# Patient Record
Sex: FEMALE | Race: WHITE | Employment: FULL TIME | URBAN - METROPOLITAN AREA
[De-identification: names, ages, dates, MRNs, and addresses within clinical notes are randomized per-mention and may not be internally consistent; named-entity substitution may affect disease eponyms.]

---

## 2023-07-13 ENCOUNTER — OFFICE VISIT (OUTPATIENT)
Dept: PRIMARY CARE | Facility: CLINIC | Age: 43
End: 2023-07-13
Payer: COMMERCIAL

## 2023-07-13 VITALS — WEIGHT: 157 LBS | HEIGHT: 66 IN | BODY MASS INDEX: 25.23 KG/M2

## 2023-07-13 DIAGNOSIS — Z00.00 HEALTH CARE MAINTENANCE: Primary | ICD-10-CM

## 2023-07-13 DIAGNOSIS — Z01.419 ENCOUNTER FOR GYNECOLOGICAL EXAMINATION: ICD-10-CM

## 2023-07-13 PROCEDURE — 99396 PREV VISIT EST AGE 40-64: CPT | Performed by: INTERNAL MEDICINE

## 2023-07-14 ENCOUNTER — LAB (OUTPATIENT)
Dept: LAB | Facility: LAB | Age: 43
End: 2023-07-14
Payer: COMMERCIAL

## 2023-07-14 DIAGNOSIS — Z00.00 HEALTH CARE MAINTENANCE: ICD-10-CM

## 2023-07-14 LAB
ALANINE AMINOTRANSFERASE (SGPT) (U/L) IN SER/PLAS: 15 U/L (ref 7–45)
ALBUMIN (G/DL) IN SER/PLAS: 4.2 G/DL (ref 3.4–5)
ALKALINE PHOSPHATASE (U/L) IN SER/PLAS: 95 U/L (ref 33–110)
ANION GAP IN SER/PLAS: 12 MMOL/L (ref 10–20)
ASPARTATE AMINOTRANSFERASE (SGOT) (U/L) IN SER/PLAS: 16 U/L (ref 9–39)
BILIRUBIN TOTAL (MG/DL) IN SER/PLAS: 0.3 MG/DL (ref 0–1.2)
CALCIUM (MG/DL) IN SER/PLAS: 9.6 MG/DL (ref 8.6–10.6)
CARBON DIOXIDE, TOTAL (MMOL/L) IN SER/PLAS: 26 MMOL/L (ref 21–32)
CHLORIDE (MMOL/L) IN SER/PLAS: 106 MMOL/L (ref 98–107)
CHOLESTEROL (MG/DL) IN SER/PLAS: 160 MG/DL (ref 0–199)
CHOLESTEROL IN HDL (MG/DL) IN SER/PLAS: 38.3 MG/DL
CHOLESTEROL/HDL RATIO: 4.2
COBALAMIN (VITAMIN B12) (PG/ML) IN SER/PLAS: 394 PG/ML (ref 211–911)
CREATININE (MG/DL) IN SER/PLAS: 0.73 MG/DL (ref 0.5–1.05)
ERYTHROCYTE DISTRIBUTION WIDTH (RATIO) BY AUTOMATED COUNT: 13.3 % (ref 11.5–14.5)
ERYTHROCYTE MEAN CORPUSCULAR HEMOGLOBIN CONCENTRATION (G/DL) BY AUTOMATED: 34.3 G/DL (ref 32–36)
ERYTHROCYTE MEAN CORPUSCULAR VOLUME (FL) BY AUTOMATED COUNT: 92 FL (ref 80–100)
ERYTHROCYTES (10*6/UL) IN BLOOD BY AUTOMATED COUNT: 4.58 X10E12/L (ref 4–5.2)
GFR FEMALE: >90 ML/MIN/1.73M2
GLUCOSE (MG/DL) IN SER/PLAS: 80 MG/DL (ref 74–99)
HEMATOCRIT (%) IN BLOOD BY AUTOMATED COUNT: 42.3 % (ref 36–46)
HEMOGLOBIN (G/DL) IN BLOOD: 14.5 G/DL (ref 12–16)
IRON (UG/DL) IN SER/PLAS: 116 UG/DL (ref 35–150)
IRON BINDING CAPACITY (UG/DL) IN SER/PLAS: 388 UG/DL (ref 240–445)
IRON SATURATION (%) IN SER/PLAS: 30 % (ref 25–45)
LDL: 94 MG/DL (ref 0–99)
LEUKOCYTES (10*3/UL) IN BLOOD BY AUTOMATED COUNT: 9.9 X10E9/L (ref 4.4–11.3)
NRBC (PER 100 WBCS) BY AUTOMATED COUNT: 0 /100 WBC (ref 0–0)
PLATELETS (10*3/UL) IN BLOOD AUTOMATED COUNT: 197 X10E9/L (ref 150–450)
POTASSIUM (MMOL/L) IN SER/PLAS: 4.1 MMOL/L (ref 3.5–5.3)
PROTEIN TOTAL: 7.1 G/DL (ref 6.4–8.2)
SODIUM (MMOL/L) IN SER/PLAS: 140 MMOL/L (ref 136–145)
TRIGLYCERIDE (MG/DL) IN SER/PLAS: 139 MG/DL (ref 0–149)
UREA NITROGEN (MG/DL) IN SER/PLAS: 12 MG/DL (ref 6–23)
VLDL: 28 MG/DL (ref 0–40)

## 2023-07-14 PROCEDURE — 36415 COLL VENOUS BLD VENIPUNCTURE: CPT

## 2023-07-14 PROCEDURE — 83540 ASSAY OF IRON: CPT

## 2023-07-14 PROCEDURE — 80061 LIPID PANEL: CPT

## 2023-07-14 PROCEDURE — 83550 IRON BINDING TEST: CPT

## 2023-07-14 PROCEDURE — 85027 COMPLETE CBC AUTOMATED: CPT

## 2023-07-14 PROCEDURE — 80053 COMPREHEN METABOLIC PANEL: CPT

## 2023-07-14 PROCEDURE — 82607 VITAMIN B-12: CPT

## 2023-07-15 NOTE — PROGRESS NOTES
"Subjective   Patient ID: Larissa Smiley is a 43 y.o. female who presents for CPE.    HPI   Patient is here for physical   Wants referral to OB/GYN she goes to Dr. Malia Kimbrough   She has taken leave of absence because she is stressed out at work yesterday she felt sharp pain in the chest and she could not control her breathing felt like her ankle.  She works in retail    complaints of sinus congestion chronically since last week no symptoms of slurring of having local sinus congestion cough  Under care of Dr. galindo for GYN  Also complaining of right shoulder pain denies any injury does a lot of repetitive work from the right arm     Past medical history: Migraine, frequent sinus infections, cholecystectomy, IUD, lumpectomy right breast  Social history: 1 to 2 cups of caffeine, alcohol rarely, 7 to 10 cigarettes/day  Family history: Mother colectomy Father  of alcoholism, sister on dialysis had kidney transplant   Review of Systems    Objective   Ht 1.676 m (5' 6\")   Wt 71.2 kg (157 lb)   BMI 25.34 kg/m²     Physical Exam  Vitals reviewed.   Constitutional:       Appearance: Normal appearance.   HENT:      Head: Normocephalic and atraumatic.      Right Ear: Tympanic membrane, ear canal and external ear normal.      Left Ear: Tympanic membrane, ear canal and external ear normal.      Nose: Nose normal.      Comments: Deviated septum to the right     Mouth/Throat:      Pharynx: Oropharynx is clear.   Eyes:      Extraocular Movements: Extraocular movements intact.      Conjunctiva/sclera: Conjunctivae normal.      Pupils: Pupils are equal, round, and reactive to light.   Cardiovascular:      Rate and Rhythm: Normal rate and regular rhythm.      Pulses: Normal pulses.      Heart sounds: Normal heart sounds.   Pulmonary:      Effort: Pulmonary effort is normal.      Breath sounds: Normal breath sounds.   Abdominal:      General: Abdomen is flat. Bowel sounds are normal.      Palpations: Abdomen is soft. "   Musculoskeletal:      Cervical back: Normal range of motion and neck supple.   Skin:     General: Skin is warm and dry.   Neurological:      General: No focal deficit present.      Mental Status: She is alert and oriented to person, place, and time.   Psychiatric:         Mood and Affect: Mood normal.         Assessment/Plan   Problem List Items Addressed This Visit    None  Visit Diagnoses       Health care maintenance    -  Primary    Relevant Orders    CBC (Completed)    Comprehensive Metabolic Panel (Completed)    Lipid Panel (Completed)    Iron and TIBC (Completed)    Vitamin B12 (Completed)    Encounter for gynecological examination        Relevant Orders    Referral to Obstetrics / Gynecology          past recap  Physical normal except for deviated nasal septum  Deviated nasal septum could be the cause for #recurrent sinus infection  Patient had nasal injury as a child  Refer to ENT  Refer to GYN  Routine blood work ordered  Encouraged to quit smoking  Follow-up if blood work is abnormal   advised to see counselor for her stress and anxiety but patient does not think she needs it at this time

## 2024-03-07 ENCOUNTER — PROCEDURE VISIT (OUTPATIENT)
Dept: PRIMARY CARE | Facility: CLINIC | Age: 44
End: 2024-03-07
Payer: COMMERCIAL

## 2024-03-07 VITALS
SYSTOLIC BLOOD PRESSURE: 120 MMHG | WEIGHT: 157 LBS | DIASTOLIC BLOOD PRESSURE: 82 MMHG | HEIGHT: 66 IN | BODY MASS INDEX: 25.23 KG/M2

## 2024-03-07 DIAGNOSIS — Z12.31 SCREENING MAMMOGRAM, ENCOUNTER FOR: ICD-10-CM

## 2024-03-07 DIAGNOSIS — R87.629 ABNORMAL PAP SMEAR OF VAGINA: Primary | ICD-10-CM

## 2024-03-07 DIAGNOSIS — N95.1 MENOPAUSAL SYMPTOMS: ICD-10-CM

## 2024-03-07 PROCEDURE — 99213 OFFICE O/P EST LOW 20 MIN: CPT | Performed by: INTERNAL MEDICINE

## 2024-03-07 NOTE — PROGRESS NOTES
"Subjective   Patient ID: Larissa Smiley is a 43 y.o. female who presents for Follow-up.    HPI   Patient is here for follow-up  She is having irregular menstrual cycle  Having hot flashes mood swings  She has IUD for 8 years.       patient is here for physical   Wants referral to OB/GYN she goes to Dr. Malia Kimbrough   She has taken leave of absence because she is stressed out at work yesterday she felt sharp pain in the chest and she could not control her breathing felt like her ankle.  She works in retail    complaints of sinus congestion chronically since last week no symptoms of slurring of having local sinus congestion cough  Under care of Dr. galindo for GYN  Also complaining of right shoulder pain denies any injury does a lot of repetitive work from the right arm     Past medical history: Migraine, frequent sinus infections, cholecystectomy, IUD, lumpectomy right breast  Social history: 1 to 2 cups of caffeine, alcohol rarely, 7 to 10 cigarettes/day  Family history: Mother colectomy Father  of alcoholism, sister on dialysis had kidney transplant   Review of Systems    Objective   /82   Ht 1.676 m (5' 6\")   Wt 71.2 kg (157 lb)   BMI 25.34 kg/m²     Physical Exam  Vitals reviewed.   Constitutional:       Appearance: Normal appearance.   HENT:      Head: Normocephalic and atraumatic.      Right Ear: Tympanic membrane, ear canal and external ear normal.      Left Ear: Tympanic membrane, ear canal and external ear normal.      Nose: Nose normal.      Comments: Deviated septum to the right     Mouth/Throat:      Pharynx: Oropharynx is clear.   Eyes:      Extraocular Movements: Extraocular movements intact.      Conjunctiva/sclera: Conjunctivae normal.      Pupils: Pupils are equal, round, and reactive to light.   Cardiovascular:      Rate and Rhythm: Normal rate and regular rhythm.      Pulses: Normal pulses.      Heart sounds: Normal heart sounds.   Pulmonary:      Effort: Pulmonary effort is normal.      " Breath sounds: Normal breath sounds.   Abdominal:      General: Abdomen is flat. Bowel sounds are normal.      Palpations: Abdomen is soft.   Musculoskeletal:      Cervical back: Normal range of motion and neck supple.   Skin:     General: Skin is warm and dry.   Neurological:      General: No focal deficit present.      Mental Status: She is alert and oriented to person, place, and time.   Psychiatric:         Mood and Affect: Mood normal.         Assessment/Plan   Problem List Items Addressed This Visit    None  Visit Diagnoses       Abnormal Pap smear of vagina    -  Primary    Menopausal symptoms        Relevant Orders    Referral to Obstetrics / Gynecology    Screening mammogram, encounter for        Relevant Orders    BI mammo bilateral screening tomosynthesis (Completed)          past recap  Physical normal except for deviated nasal septum  Deviated nasal septum could be the cause for #recurrent sinus infection  Patient had nasal injury as a child  Refer to ENT  Refer to GYN  Routine blood work ordered  Encouraged to quit smoking  Follow-up if blood work is abnormal   advised to see counselor for her stress and anxiety but patient does not think she needs it at this time    3/7/2024  Patient has history of abnormal Pap 2022  Patient needs to have IUD removed  Refer to OB/GYN for menopausal symptoms  Mammogram ordered

## 2024-03-13 ENCOUNTER — HOSPITAL ENCOUNTER (OUTPATIENT)
Dept: RADIOLOGY | Facility: CLINIC | Age: 44
Discharge: HOME | End: 2024-03-13
Payer: COMMERCIAL

## 2024-03-13 VITALS — BODY MASS INDEX: 26.52 KG/M2 | WEIGHT: 165 LBS | HEIGHT: 66 IN

## 2024-03-13 DIAGNOSIS — Z12.31 SCREENING MAMMOGRAM, ENCOUNTER FOR: ICD-10-CM

## 2024-03-13 PROCEDURE — 77063 BREAST TOMOSYNTHESIS BI: CPT | Performed by: RADIOLOGY

## 2024-03-13 PROCEDURE — 77067 SCR MAMMO BI INCL CAD: CPT | Performed by: RADIOLOGY

## 2024-03-13 PROCEDURE — 77067 SCR MAMMO BI INCL CAD: CPT

## 2024-06-05 NOTE — PROGRESS NOTES
"Annual  Subjective   HPI:  Larissa Smiley is a 44 y.o. female  No LMP recorded (lmp unknown). for annual exam.    Complaints:   none  Periods: none with Mirena  Dysmenorrhea:  none    GYNH:   Current Mirena     History of abnormal Pap smear:   YES  History of abnormal mammogram:     OB History          2    Para   1    Term   1       0    AB   1    Living   1         SAB   1    IAB   0    Ectopic   0    Multiple   0    Live Births   1                  Past Medical History:   Diagnosis Date    Personal history of (healed) traumatic fracture 10/22/2020    History of fracture of nasal bone       Past Surgical History:   Procedure Laterality Date    OTHER SURGICAL HISTORY  10/22/2020    Cholecystectomy    OTHER SURGICAL HISTORY  10/22/2020    Lumpectomy       Prior to Admission medications    Medication Sig Start Date End Date Taking? Authorizing Provider   Mirena 21 mcg/24 hr (8 yrs) 52 mg IUD 52 mg by intrauterine route 1 time. 24  Yes Historical Provider, MD       Social History     Tobacco Use    Smoking status: Some Days     Current packs/day: 0.50     Average packs/day: 0.5 packs/day for 30.9 years (15.5 ttl pk-yrs)     Types: Cigarettes     Start date: 1993    Smokeless tobacco: Never   Vaping Use    Vaping status: Never Used   Substance Use Topics    Drug use: Never        Objective   /64   Ht 1.676 m (5' 6\")   Wt 76.2 kg (168 lb)   LMP  (LMP Unknown) Comment: iud  BMI 27.12 kg/m²      General:   Alert and oriented, in no acute distress   Neck: Supple. No visible thyromegaly.    Breast/Axilla: Normal to palpation bilaterally without masses, skin changes, or nipple discharge.    Abdomen: Soft, non-tender, without masses or organomegaly   Vulva: Normal architecture without erythema, masses, or lesions.    Vagina: Normal mucosa without lesions, masses, or atrophy. No abnormal vaginal discharge.    Cervix: Normal without masses, lesions, or signs of cervicitis   Uterus: Normal, " mobile, non-enlarged uterus   Adnexa: Normal without masses or lesions   Pelvic Floor normal   Psych Normal affect. Normal mood.      Assessment/Plan     Routine annual  OB/GYN Preventive:     - Pap smear indicated every 3-5 years if normal and otherwise low risk   - Self breast exam monthly and clinical breast examination/mammogram yearly   - Screening colonoscopy recommended starting age 45, then Q3-10 years depending on testing and family history   - Genitourinary skin hygiene discussed.  - Diet/Weight management discussed.  - Exercise 30-60 minutes 3-5 times/day    Malia Kimbrough MD      IUD Removal    Date/Time: 6/6/2024 10:16 AM    Performed by: Malia Kimbrough MD  Authorized by: Malia Kimbrough MD    Consent:     Consent obtained:  Verbal    Consent given by:  Patient    Procedure risks and benefits discussed: yes      Patient questions answered: yes      Patient agrees, verbalizes understanding, and wants to proceed: yes    Procedure:     Removed with no complications: yes

## 2024-06-06 ENCOUNTER — OFFICE VISIT (OUTPATIENT)
Dept: OBSTETRICS AND GYNECOLOGY | Facility: CLINIC | Age: 44
End: 2024-06-06
Payer: COMMERCIAL

## 2024-06-06 VITALS
HEIGHT: 66 IN | BODY MASS INDEX: 27 KG/M2 | SYSTOLIC BLOOD PRESSURE: 112 MMHG | WEIGHT: 168 LBS | DIASTOLIC BLOOD PRESSURE: 64 MMHG

## 2024-06-06 DIAGNOSIS — Z01.419 ENCOUNTER FOR GYNECOLOGICAL EXAMINATION WITHOUT ABNORMAL FINDING: Primary | ICD-10-CM

## 2024-06-06 DIAGNOSIS — Z30.433 ENCOUNTER FOR REMOVAL AND REINSERTION OF INTRAUTERINE CONTRACEPTIVE DEVICE (IUD): ICD-10-CM

## 2024-06-06 PROCEDURE — 58300 INSERT INTRAUTERINE DEVICE: CPT | Performed by: OBSTETRICS & GYNECOLOGY

## 2024-06-06 PROCEDURE — 99396 PREV VISIT EST AGE 40-64: CPT | Performed by: OBSTETRICS & GYNECOLOGY

## 2024-06-06 PROCEDURE — 58301 REMOVE INTRAUTERINE DEVICE: CPT | Performed by: OBSTETRICS & GYNECOLOGY

## 2024-06-06 PROCEDURE — 2500000004 HC RX 250 GENERAL PHARMACY W/ HCPCS (ALT 636 FOR OP/ED): Performed by: OBSTETRICS & GYNECOLOGY

## 2024-06-06 PROCEDURE — 99396 PREV VISIT EST AGE 40-64: CPT | Mod: 25 | Performed by: OBSTETRICS & GYNECOLOGY

## 2024-06-06 RX ORDER — LEVONORGESTREL 52 MG/1
1 INTRAUTERINE DEVICE INTRAUTERINE ONCE
COMMUNITY
Start: 2024-04-12

## 2024-06-06 RX ADMIN — LEVONORGESTREL 52 MG: 52 INTRAUTERINE DEVICE INTRAUTERINE at 12:49

## 2024-06-06 ASSESSMENT — PAIN SCALES - GENERAL: PAINLEVEL: 0-NO PAIN

## 2024-06-06 ASSESSMENT — ENCOUNTER SYMPTOMS
DEPRESSION: 0
LOSS OF SENSATION IN FEET: 0
OCCASIONAL FEELINGS OF UNSTEADINESS: 0

## 2024-06-06 ASSESSMENT — PATIENT HEALTH QUESTIONNAIRE - PHQ9
1. LITTLE INTEREST OR PLEASURE IN DOING THINGS: NOT AT ALL
SUM OF ALL RESPONSES TO PHQ9 QUESTIONS 1 & 2: 0
2. FEELING DOWN, DEPRESSED OR HOPELESS: NOT AT ALL

## 2024-06-06 ASSESSMENT — LIFESTYLE VARIABLES
AUDIT-C TOTAL SCORE: 1
SKIP TO QUESTIONS 9-10: 1
HOW OFTEN DO YOU HAVE A DRINK CONTAINING ALCOHOL: MONTHLY OR LESS
HOW MANY STANDARD DRINKS CONTAINING ALCOHOL DO YOU HAVE ON A TYPICAL DAY: 1 OR 2
HOW OFTEN DO YOU HAVE SIX OR MORE DRINKS ON ONE OCCASION: NEVER

## 2024-06-06 NOTE — PROGRESS NOTES
IUD Insertion    Date/Time: 6/6/2024 10:18 AM    Performed by: Malia Kimbrough MD  Authorized by: Malia Kimbrough MD    Consent:     Procedure risks and benefits discussed: yes      Patient questions answered: yes      Patient agrees, verbalizes understanding, and wants to proceed: yes    Procedure:     Pelvic exam performed: yes      Cervix cleaned and prepped: yes      Speculum placed in vagina: yes      Tenaculum applied to cervix: yes      Uterus sounded: yes      Uterus sound depth (cm):  7    IUD inserted with no complications: yes      IUD type:  Mirena    Strings trimmed: yes    Post-procedure:     Patient tolerated procedure well: yes

## 2024-06-17 LAB
CYTOLOGY CMNT CVX/VAG CYTO-IMP: NORMAL
HPV HR 12 DNA GENITAL QL NAA+PROBE: POSITIVE
HPV HR GENOTYPES PNL CVX NAA+PROBE: POSITIVE
HPV16 DNA SPEC QL NAA+PROBE: NEGATIVE
HPV18 DNA SPEC QL NAA+PROBE: NEGATIVE
LAB AP HPV GENOTYPE QUESTION: YES
LAB AP HPV HR: NORMAL
LABORATORY COMMENT REPORT: NORMAL
PATH REPORT.TOTAL CANCER: NORMAL

## 2024-07-15 ENCOUNTER — APPOINTMENT (OUTPATIENT)
Dept: OBSTETRICS AND GYNECOLOGY | Facility: CLINIC | Age: 44
End: 2024-07-15
Payer: COMMERCIAL

## 2024-11-18 ENCOUNTER — OFFICE VISIT (OUTPATIENT)
Dept: PRIMARY CARE | Facility: CLINIC | Age: 44
End: 2024-11-18
Payer: COMMERCIAL

## 2024-11-18 VITALS
WEIGHT: 173 LBS | SYSTOLIC BLOOD PRESSURE: 116 MMHG | BODY MASS INDEX: 27.8 KG/M2 | HEIGHT: 66 IN | DIASTOLIC BLOOD PRESSURE: 62 MMHG

## 2024-11-18 DIAGNOSIS — H69.91 EUSTACHIAN TUBE DYSFUNCTION, RIGHT: Primary | ICD-10-CM

## 2024-11-18 DIAGNOSIS — R09.81 SINUS CONGESTION: ICD-10-CM

## 2024-11-18 PROCEDURE — 99213 OFFICE O/P EST LOW 20 MIN: CPT | Performed by: INTERNAL MEDICINE

## 2024-11-18 PROCEDURE — 3008F BODY MASS INDEX DOCD: CPT | Performed by: INTERNAL MEDICINE

## 2024-11-18 RX ORDER — AZITHROMYCIN 250 MG/1
TABLET, FILM COATED ORAL
Qty: 6 TABLET | Refills: 0 | Status: SHIPPED | OUTPATIENT
Start: 2024-11-18 | End: 2024-11-23

## 2024-11-18 NOTE — PROGRESS NOTES
"Subjective   Patient ID: Larissa Smiley is a 44 y.o. female who presents for sinus congestion.    HPI   Patient is here with complaints of having sinus congestion pressure in the right ear sore throat  Symptoms going on for 2 weeks  Tried all the over-the-counter medications and inhaler nothing is helping     past recap     patient is here for follow-up  She is having irregular menstrual cycle  Having hot flashes mood swings  She has IUD for 8 years.     patient is here for physical   Wants referral to OB/GYN she goes to Dr. Malia Kimbrough   She has taken leave of absence because she is stressed out at work yesterday she felt sharp pain in the chest and she could not control her breathing felt like her ankle.  She works in retail    complaints of sinus congestion chronically since last week no symptoms of slurring of having local sinus congestion cough  Under care of Dr. galindo for GYN  Also complaining of right shoulder pain denies any injury does a lot of repetitive work from the right arm     Past medical history: Migraine, frequent sinus infections, cholecystectomy, IUD, lumpectomy right breast  Social history: 1 to 2 cups of caffeine, alcohol rarely, 7 to 10 cigarettes/day  Family history: Mother colectomy Father  of alcoholism, sister on dialysis had kidney transplant   Review of Systems    Objective   /62   Ht 1.676 m (5' 6\")   Wt 78.5 kg (173 lb)   BMI 27.92 kg/m²     Physical Exam  Vitals reviewed.   Constitutional:       Appearance: Normal appearance.   HENT:      Head: Normocephalic and atraumatic.      Right Ear: Tympanic membrane, ear canal and external ear normal.      Left Ear: Tympanic membrane, ear canal and external ear normal.      Ears:      Comments: Right tympanic membrane dull and retracted     Nose: Congestion and rhinorrhea present.      Comments: Deviated septum to the right     Mouth/Throat:      Pharynx: Oropharynx is clear.   Eyes:      Extraocular Movements: Extraocular " movements intact.      Conjunctiva/sclera: Conjunctivae normal.      Pupils: Pupils are equal, round, and reactive to light.   Cardiovascular:      Rate and Rhythm: Normal rate and regular rhythm.      Pulses: Normal pulses.      Heart sounds: Normal heart sounds.   Pulmonary:      Effort: Pulmonary effort is normal.      Breath sounds: Normal breath sounds.   Abdominal:      General: Abdomen is flat. Bowel sounds are normal.      Palpations: Abdomen is soft.   Musculoskeletal:      Cervical back: Normal range of motion and neck supple.   Skin:     General: Skin is warm and dry.   Neurological:      General: No focal deficit present.      Mental Status: She is alert and oriented to person, place, and time.   Psychiatric:         Mood and Affect: Mood normal.         Assessment/Plan   Problem List Items Addressed This Visit    None  Visit Diagnoses       Eustachian tube dysfunction, right    -  Primary    Sinus congestion        Relevant Medications    azithromycin (Zithromax) 250 mg tablet            past recap  Physical normal except for deviated nasal septum  Deviated nasal septum could be the cause for #recurrent sinus infection  Patient had nasal injury as a child  Refer to ENT  Refer to GYN  Routine blood work ordered  Encouraged to quit smoking  Follow-up if blood work is abnormal   advised to see counselor for her stress and anxiety but patient does not think she needs it at this time    3/7/2024  Patient has history of abnormal Pap 2022  Patient needs to have IUD removed  Refer to OB/GYN for menopausal symptoms  Mammogram ordered    11/18/2024  Treat with Z-Stan  States he gargles rest fluids  Flonase nasal spray  Claritin-D  Follow-up if not better

## 2025-04-14 ENCOUNTER — APPOINTMENT (OUTPATIENT)
Dept: PRIMARY CARE | Facility: CLINIC | Age: 45
End: 2025-04-14
Payer: COMMERCIAL

## 2025-04-14 VITALS
HEIGHT: 66 IN | WEIGHT: 170 LBS | DIASTOLIC BLOOD PRESSURE: 72 MMHG | BODY MASS INDEX: 27.32 KG/M2 | SYSTOLIC BLOOD PRESSURE: 124 MMHG

## 2025-04-14 DIAGNOSIS — R09.89 CHEST CONGESTION: ICD-10-CM

## 2025-04-14 PROCEDURE — 3008F BODY MASS INDEX DOCD: CPT | Performed by: INTERNAL MEDICINE

## 2025-04-14 PROCEDURE — 99213 OFFICE O/P EST LOW 20 MIN: CPT | Performed by: INTERNAL MEDICINE

## 2025-04-14 RX ORDER — AZITHROMYCIN 500 MG/1
500 TABLET, FILM COATED ORAL DAILY
Qty: 10 TABLET | Refills: 0 | Status: SHIPPED | OUTPATIENT
Start: 2025-04-14 | End: 2025-04-24

## 2025-04-14 NOTE — PROGRESS NOTES
"Subjective   Patient ID: Larissa Smiley is a 45 y.o. female who presents for Follow-up.    HPI   Patient is here with complaints of having sinus congestion pressure in the right ear sore throat  Symptoms going on for 2 weeks  Tried all the over-the-counter medications and inhaler nothing is helping  Having headache.  She is going on vacation to [facility]     past recap     patient is here for follow-up  She is having irregular menstrual cycle  Having hot flashes mood swings  She has IUD for 8 years.     patient is here for physical   Wants referral to OB/GYN she goes to Dr. Malia Kimbrough   She has taken leave of absence because she is stressed out at work yesterday she felt sharp pain in the chest and she could not control her breathing felt like her ankle.  She works in retail    complaints of sinus congestion chronically since last week no symptoms of slurring of having local sinus congestion cough  Under care of Dr. galindo for GYN  Also complaining of right shoulder pain denies any injury does a lot of repetitive work from the right arm     Past medical history: Migraine, frequent sinus infections, cholecystectomy, IUD, lumpectomy right breast  Social history: 1 to 2 cups of caffeine, alcohol rarely, 7 to 10 cigarettes/day  Family history: Mother colectomy Father  of alcoholism, sister on dialysis had kidney transplant   Review of Systems    Objective   /72   Ht 1.676 m (5' 6\")   Wt 77.1 kg (170 lb)   BMI 27.44 kg/m²     Physical Exam  Vitals reviewed.   Constitutional:       Appearance: Normal appearance.   HENT:      Head: Normocephalic and atraumatic.      Right Ear: Tympanic membrane, ear canal and external ear normal.      Left Ear: Tympanic membrane, ear canal and external ear normal.      Ears:      Comments: Right tympanic membrane dull and retracted     Nose: Nose normal.      Comments: Deviated septum to the right     Mouth/Throat:      Pharynx: Oropharynx is clear.   Eyes:      Extraocular " Movements: Extraocular movements intact.      Conjunctiva/sclera: Conjunctivae normal.      Pupils: Pupils are equal, round, and reactive to light.   Cardiovascular:      Rate and Rhythm: Normal rate and regular rhythm.      Pulses: Normal pulses.      Heart sounds: Normal heart sounds.   Pulmonary:      Effort: Pulmonary effort is normal.      Breath sounds: Normal breath sounds.   Abdominal:      General: Abdomen is flat. Bowel sounds are normal.      Palpations: Abdomen is soft.   Musculoskeletal:      Cervical back: Normal range of motion and neck supple.   Skin:     General: Skin is warm and dry.   Neurological:      General: No focal deficit present.      Mental Status: She is alert and oriented to person, place, and time.   Psychiatric:         Mood and Affect: Mood normal.       Assessment/Plan   Problem List Items Addressed This Visit    None        past recap  Physical normal except for deviated nasal septum  Deviated nasal septum could be the cause for #recurrent sinus infection  Patient had nasal injury as a child  Refer to ENT  Refer to GYN  Routine blood work ordered  Encouraged to quit smoking  Follow-up if blood work is abnormal   advised to see counselor for her stress and anxiety but patient does not think she needs it at this time    3/7/2024  Patient has history of abnormal Pap 2022  Patient needs to have IUD removed  Refer to OB/GYN for menopausal symptoms  Mammogram ordered    11/18/2024  Treat with Z-Stan  States he gargles rest fluids  Flonase nasal spray  Claritin-D  Follow-up if not better    4/14/2025  Treated with Zithromax for 10 days  Steam saline gargles rest fluids  Decongestant over-the-counter  Continue nasal spray  Follow-up if not better

## 2025-04-24 ENCOUNTER — APPOINTMENT (OUTPATIENT)
Dept: URGENT CARE | Age: 45
End: 2025-04-24
Payer: COMMERCIAL

## 2025-07-17 ENCOUNTER — OFFICE VISIT (OUTPATIENT)
Dept: PRIMARY CARE | Facility: CLINIC | Age: 45
End: 2025-07-17
Payer: COMMERCIAL

## 2025-07-17 VITALS
DIASTOLIC BLOOD PRESSURE: 70 MMHG | WEIGHT: 168.4 LBS | SYSTOLIC BLOOD PRESSURE: 122 MMHG | HEIGHT: 66 IN | BODY MASS INDEX: 27.06 KG/M2

## 2025-07-17 DIAGNOSIS — M25.50 ARTHRALGIA, UNSPECIFIED JOINT: ICD-10-CM

## 2025-07-17 DIAGNOSIS — R20.0 NUMBNESS: ICD-10-CM

## 2025-07-17 PROCEDURE — 3008F BODY MASS INDEX DOCD: CPT | Performed by: INTERNAL MEDICINE

## 2025-07-17 PROCEDURE — 99213 OFFICE O/P EST LOW 20 MIN: CPT | Performed by: INTERNAL MEDICINE

## 2025-07-17 NOTE — PROGRESS NOTES
"Subjective   Patient ID: Larissa Smiley is a 45 y.o. female who presents for Hand Pain.    Hand Pain     Patient is here with complaints of having pain and swelling in the hands.  Her palm gets all red and puffy  She works at the bank and uses computer  Hands and fingers get numb    Past recap  Patient is here with complaints of having sinus congestion pressure in the right ear sore throat  Symptoms going on for 2 weeks  Tried all the over-the-counter medications and inhaler nothing is helping  Having headache.  She is going on vacation to [facility]     past recap     patient is here for follow-up  She is having irregular menstrual cycle  Having hot flashes mood swings  She has IUD for 8 years.     patient is here for physical   Wants referral to OB/GYN she goes to Dr. Malia Kimbrough   She has taken leave of absence because she is stressed out at work yesterday she felt sharp pain in the chest and she could not control her breathing felt like her ankle.  She works in retail    complaints of sinus congestion chronically since last week no symptoms of slurring of having local sinus congestion cough  Under care of Dr. galindo for GYN  Also complaining of right shoulder pain denies any injury does a lot of repetitive work from the right arm     Past medical history: Migraine, frequent sinus infections, cholecystectomy, IUD, lumpectomy right breast  Social history: 1 to 2 cups of caffeine, alcohol rarely, 7 to 10 cigarettes/day  Family history: Mother colectomy Father  of alcoholism, sister on dialysis had kidney transplant   Review of Systems    Objective   /70   Ht 1.676 m (5' 6\")   Wt 76.4 kg (168 lb 6.4 oz)   BMI 27.18 kg/m²     Physical Exam  Vitals reviewed.   Constitutional:       Appearance: Normal appearance.   HENT:      Head: Normocephalic and atraumatic.      Right Ear: Tympanic membrane, ear canal and external ear normal.      Left Ear: Tympanic membrane, ear canal and external ear normal.      Nose: " Nose normal.      Comments: Deviated septum     Mouth/Throat:      Pharynx: Oropharynx is clear.     Eyes:      Extraocular Movements: Extraocular movements intact.      Conjunctiva/sclera: Conjunctivae normal.      Pupils: Pupils are equal, round, and reactive to light.       Cardiovascular:      Rate and Rhythm: Normal rate and regular rhythm.      Pulses: Normal pulses.      Heart sounds: Normal heart sounds.   Pulmonary:      Effort: Pulmonary effort is normal.      Breath sounds: Normal breath sounds.   Abdominal:      General: Abdomen is flat. Bowel sounds are normal.      Palpations: Abdomen is soft.     Musculoskeletal:      Cervical back: Normal range of motion and neck supple.     Skin:     General: Skin is warm and dry.     Neurological:      General: No focal deficit present.      Mental Status: She is alert and oriented to person, place, and time.     Psychiatric:         Mood and Affect: Mood normal.         Assessment/Plan   Problem List Items Addressed This Visit    None  Visit Diagnoses         Numbness        Relevant Orders    EMG & nerve conduction      Arthralgia, unspecified joint        Relevant Orders    YVETTE    Rheumatoid Factor    Sedimentation Rate    C-reactive protein    Thyroid Stimulating Hormone    CBC    Uric Acid              past recap  Physical normal except for deviated nasal septum  Deviated nasal septum could be the cause for #recurrent sinus infection  Patient had nasal injury as a child  Refer to ENT  Refer to GYN  Routine blood work ordered  Encouraged to quit smoking  Follow-up if blood work is abnormal   advised to see counselor for her stress and anxiety but patient does not think she needs it at this time    3/7/2024  Patient has history of abnormal Pap 2022  Patient needs to have IUD removed  Refer to OB/GYN for menopausal symptoms  Mammogram ordered    11/18/2024  Treat with Z-Stan  States he gargles rest fluids  Flonase nasal spray  Claritin-D  Follow-up if not  better    4/14/2025  Treated with Zithromax for 10 days  Steam saline gargles rest fluids  Decongestant over-the-counter  Continue nasal spray  Follow-up if not better    7/17/2025  Will do EMG of both hands to rule out carpal tunnel  Will do blood work to rule out lupus or rheumatoid arthritis  Discussed exercises for the hand to avoid degenerative changes  Follow-up after the test

## 2025-07-19 LAB
ANA SER QL IF: NORMAL
CRP SERPL-MCNC: NORMAL MG/L
ERYTHROCYTE [DISTWIDTH] IN BLOOD BY AUTOMATED COUNT: 13 % (ref 11–15)
ERYTHROCYTE [SEDIMENTATION RATE] IN BLOOD BY WESTERGREN METHOD: 11 MM/H
HCT VFR BLD AUTO: 42.2 % (ref 35–45)
HGB BLD-MCNC: 13.9 G/DL (ref 11.7–15.5)
MCH RBC QN AUTO: 30.8 PG (ref 27–33)
MCHC RBC AUTO-ENTMCNC: 32.9 G/DL (ref 32–36)
MCV RBC AUTO: 93.4 FL (ref 80–100)
PLATELET # BLD AUTO: 203 THOUSAND/UL (ref 140–400)
PMV BLD REES-ECKER: 11.6 FL (ref 7.5–12.5)
RBC # BLD AUTO: 4.52 MILLION/UL (ref 3.8–5.1)
RHEUMATOID FACT SERPL-ACNC: NORMAL [IU]/ML
TSH SERPL-ACNC: 2.2 MIU/L
URATE SERPL-MCNC: 4.8 MG/DL (ref 2.5–7)
WBC # BLD AUTO: 9.4 THOUSAND/UL (ref 3.8–10.8)

## 2025-07-21 LAB
ANA SER QL IF: NEGATIVE
CRP SERPL-MCNC: 4.7 MG/L
ERYTHROCYTE [DISTWIDTH] IN BLOOD BY AUTOMATED COUNT: 13 % (ref 11–15)
ERYTHROCYTE [SEDIMENTATION RATE] IN BLOOD BY WESTERGREN METHOD: 11 MM/H
HCT VFR BLD AUTO: 42.2 % (ref 35–45)
HGB BLD-MCNC: 13.9 G/DL (ref 11.7–15.5)
MCH RBC QN AUTO: 30.8 PG (ref 27–33)
MCHC RBC AUTO-ENTMCNC: 32.9 G/DL (ref 32–36)
MCV RBC AUTO: 93.4 FL (ref 80–100)
PLATELET # BLD AUTO: 203 THOUSAND/UL (ref 140–400)
PMV BLD REES-ECKER: 11.6 FL (ref 7.5–12.5)
RBC # BLD AUTO: 4.52 MILLION/UL (ref 3.8–5.1)
RHEUMATOID FACT SERPL-ACNC: <10 IU/ML
TSH SERPL-ACNC: 2.2 MIU/L
URATE SERPL-MCNC: 4.8 MG/DL (ref 2.5–7)
WBC # BLD AUTO: 9.4 THOUSAND/UL (ref 3.8–10.8)

## 2025-09-04 ENCOUNTER — APPOINTMENT (OUTPATIENT)
Dept: PHYSICAL MEDICINE AND REHAB | Facility: CLINIC | Age: 45
End: 2025-09-04
Payer: COMMERCIAL